# Patient Record
Sex: FEMALE | Race: WHITE | URBAN - METROPOLITAN AREA
[De-identification: names, ages, dates, MRNs, and addresses within clinical notes are randomized per-mention and may not be internally consistent; named-entity substitution may affect disease eponyms.]

---

## 2017-05-01 ENCOUNTER — HOSPITAL ENCOUNTER (EMERGENCY)
Facility: CLINIC | Age: 27
Discharge: HOME | End: 2017-05-01
Attending: PHYSICIAN ASSISTANT | Admitting: PHYSICIAN ASSISTANT
Payer: COMMERCIAL

## 2017-05-01 VITALS
DIASTOLIC BLOOD PRESSURE: 62 MMHG | OXYGEN SATURATION: 96 % | HEART RATE: 84 BPM | SYSTOLIC BLOOD PRESSURE: 112 MMHG | RESPIRATION RATE: 16 BRPM | HEIGHT: 69 IN | BODY MASS INDEX: 18.51 KG/M2 | WEIGHT: 125 LBS | TEMPERATURE: 97.4 F

## 2017-05-01 DIAGNOSIS — N30.00 ACUTE CYSTITIS WITHOUT HEMATURIA: ICD-10-CM

## 2017-05-01 LAB
ALBUMIN UR-MCNC: NEGATIVE MG/DL
APPEARANCE UR: CLEAR
BACTERIA #/AREA URNS HPF: ABNORMAL /HPF
BILIRUB UR QL STRIP: NEGATIVE
COLOR UR AUTO: YELLOW
GLUCOSE UR STRIP-MCNC: NEGATIVE MG/DL
HCG UR QL: NEGATIVE
HGB UR QL STRIP: NEGATIVE
KETONES UR STRIP-MCNC: NEGATIVE MG/DL
LEUKOCYTE ESTERASE UR QL STRIP: ABNORMAL
NITRATE UR QL: NEGATIVE
PH UR STRIP: 5 PH (ref 5–7)
RBC #/AREA URNS AUTO: <1 /HPF (ref 0–2)
SP GR UR STRIP: 1 (ref 1–1.03)
URN SPEC COLLECT METH UR: ABNORMAL
UROBILINOGEN UR STRIP-MCNC: NORMAL MG/DL (ref 0–2)
WBC #/AREA URNS AUTO: 21 /HPF (ref 0–2)

## 2017-05-01 PROCEDURE — 99283 EMERGENCY DEPT VISIT LOW MDM: CPT

## 2017-05-01 PROCEDURE — 81001 URINALYSIS AUTO W/SCOPE: CPT | Performed by: PHYSICIAN ASSISTANT

## 2017-05-01 RX ORDER — CEPHALEXIN 500 MG/1
500 CAPSULE ORAL 4 TIMES DAILY
Qty: 28 CAPSULE | Refills: 0 | Status: SHIPPED | OUTPATIENT
Start: 2017-05-01 | End: 2017-05-08

## 2017-05-01 RX ORDER — DESOGESTREL AND ETHINYL ESTRADIOL 0.15-0.03
1 KIT ORAL DAILY
COMMUNITY

## 2017-05-01 RX ORDER — CEPHALEXIN 500 MG/1
500 CAPSULE ORAL 4 TIMES DAILY
Qty: 28 CAPSULE | Refills: 0 | Status: SHIPPED | OUTPATIENT
Start: 2017-05-01 | End: 2017-05-01

## 2017-05-01 ASSESSMENT — ENCOUNTER SYMPTOMS
VOMITING: 0
DYSURIA: 1
BACK PAIN: 0
HEMATURIA: 0
NAUSEA: 1

## 2017-05-01 NOTE — DISCHARGE INSTRUCTIONS
Discharge Instructions  Urinary Tract Infection  You have urinary tract infection, or UTI. The urinary tract includes the kidneys (which make urine), ureters (the tubes that carry urine from the kidneys to the bladder), the bladder (which stores urine), and urethra (the tube that carries urine out of the bladder).  Urinary tract infections occur when bacteria travel up the urethra into the bladder. We suspect a UTI based on chemical and microscopic findings in your urine, but if there is a question about your findings, we will do a culture to see if bacteria grow. A urine culture takes several days. You should always follow-up with your primary physician to find out about results of your culture if one was done.   Return to the Emergency Department if:    You have severe back pain.    You are vomiting so that you can t take your medicine, or have signs of dehydration (such as urinating less than 3 times per day).    You have fever over 101.5 degrees F.    You have significant confusion or are very weak, or feel very ill.    Your child seems much more ill, won t wake up, won t respond right, or is crying for a long time and won t calm down.    Your child is showing signs of dehydration, Signs of dehydration can be:  o Your infant has had no wet diapers in 4-5 hours.  o Your older child has not passed urine in 6-8 hours.  o Your infant or child starts to have dry mouth and lips, or no saliva or tears.    Follow-up with your doctor:     Children under 24 months need to be seen by their regular doctor within one week after a diagnosis of a UTI. It may be necessary to do some imaging tests to look at the child s kidney or bladder.    You should begin to feel better within 24 - 48 hours of starting your antibiotic.  If you do not, you need to be seen again.      Treatment:     You will be treated with an antibiotic to kill the bacteria. We have to make an educated guess as to which antibiotic will work for your infection.  "In most healthy people, we can guess right almost all of the time. Sometimes a culture is done to show which antibiotics will work. This usually takes 2-3 days. When the culture is done, we may have to contact you to put you on a different antibiotic.    Take a pain medication such as Tylenol  (acetaminophen), Advil  (ibuprofen), Nuprin  (ibuprofen), or Aleve  (naproxen). If you have been given a narcotic such as Vicodin  (hydrocodone with acetaminophen), Percocet  (oxycodone with acetaminophen), or codeine, do not drive for four hours after you have taken it. If the narcotic contains Tylenol  (acetaminophen), do not take Tylenol  with it. All narcotics will cause constipation, so eat a high fiber diet.      Pyridium  (phenazopyridine) or Uristat  (phenazopyridine) is a prescription medication that numbs the bladder to reduce the burning pain of some UTIs.  The same medication is available in a non-prescription version called Azo-Standard  (phenazopyridine), Urodol  (phenazopyridine), or other brand names. This medication will change the color of the urine and tears (usually blue or orange). If you wear contacts, do not wear them while taking this medication as they may be stained by the medication.    Antibiotic Warning:     If you have been placed on antibiotics - watch for signs of allergic reaction.  These include rash, lip swelling, difficulty breathing, wheezing, and dizziness.  If you develop any of these symptoms, stop the antibiotic immediately and go to an emergency room or urgent care for evaluation.    Probiotics: If you have been given an antibiotic, you may want to also take a probiotic pill or eat yogurt with live cultures. Probiotics have \"good bacteria\" to help your intestines stay healthy. Studies have shown that probiotics help prevent diarrhea and other intestine problems (including C. diff infection) when you take antibiotics. You can buy these without a prescription in the pharmacy section of " the store.   If you were given a prescription for medicine here today, be sure to read all of the information (including the package insert) that comes with your prescription.  This will include important information about the medicine, its side effects, and any warnings that you need to know about.  The pharmacist who fills the prescription can provide more information and answer questions you may have about the medicine.  If you have questions or concerns that the pharmacist cannot address, please call or return to the Emergency Department.     Remember that you can always come back to the Emergency Department if you are not able to see your regular doctor in the amount of time listed above, if you get any new symptoms, or if there is anything that worries you.

## 2017-05-01 NOTE — ED AVS SNAPSHOT
Emergency Department    [address] [facility] [address]    Phone:  [phone]    Fax:  [phone]                                       Katya Rosenthal   MRN: [id number]    Department:   Emergency Department   Date of Visit:  5/1/2017           Patient Information     Date Of Birth          1990        Your diagnoses for this visit were:     Acute cystitis without hematuria        You were seen by Eunice Santacruz PA-C.      Follow-up Information     Follow up with  Emergency Department.    Specialty:  EMERGENCY MEDICINE    Why:  If symptoms worsen    Contact information:    [address] [facility] [id number] [phone]        Discharge Instructions       Discharge Instructions  Urinary Tract Infection  You have urinary tract infection, or UTI. The urinary tract includes the kidneys (which make urine), ureters (the tubes that carry urine from the kidneys to the bladder), the bladder (which stores urine), and urethra (the tube that carries urine out of the bladder).  Urinary tract infections occur when bacteria travel up the urethra into the bladder. We suspect a UTI based on chemical and microscopic findings in your urine, but if there is a question about your findings, we will do a culture to see if bacteria grow. A urine culture takes several days. You should always follow-up with your primary physician to find out about results of your culture if one was done.   Return to the Emergency Department if:    You have severe back pain.    You are vomiting so that you can t take your medicine, or have signs of dehydration (such as urinating less than 3 times per day).    You have fever over 101.5 degrees F.    You have significant confusion or are very weak, or feel very ill.    Your child seems much more ill, won t wake up, won t respond right, or is crying for a long time and won t calm down.    Your child is showing signs of dehydration, Signs of dehydration can  be:  o Your infant has had no wet diapers in 4-5 hours.  o Your older child has not passed urine in 6-8 hours.  o Your infant or child starts to have dry mouth and lips, or no saliva or tears.    Follow-up with your doctor:     Children under 24 months need to be seen by their regular doctor within one week after a diagnosis of a UTI. It may be necessary to do some imaging tests to look at the child s kidney or bladder.    You should begin to feel better within 24 - 48 hours of starting your antibiotic.  If you do not, you need to be seen again.      Treatment:     You will be treated with an antibiotic to kill the bacteria. We have to make an educated guess as to which antibiotic will work for your infection. In most healthy people, we can guess right almost all of the time. Sometimes a culture is done to show which antibiotics will work. This usually takes 2-3 days. When the culture is done, we may have to contact you to put you on a different antibiotic.    Take a pain medication such as Tylenol  (acetaminophen), Advil  (ibuprofen), Nuprin  (ibuprofen), or Aleve  (naproxen). If you have been given a narcotic such as Vicodin  (hydrocodone with acetaminophen), Percocet  (oxycodone with acetaminophen), or codeine, do not drive for four hours after you have taken it. If the narcotic contains Tylenol  (acetaminophen), do not take Tylenol  with it. All narcotics will cause constipation, so eat a high fiber diet.      Pyridium  (phenazopyridine) or Uristat  (phenazopyridine) is a prescription medication that numbs the bladder to reduce the burning pain of some UTIs.  The same medication is available in a non-prescription version called Azo-Standard  (phenazopyridine), Urodol  (phenazopyridine), or other brand names. This medication will change the color of the urine and tears (usually blue or orange). If you wear contacts, do not wear them while taking this medication as they may be stained by the  "medication.    Antibiotic Warning:     If you have been placed on antibiotics - watch for signs of allergic reaction.  These include rash, lip swelling, difficulty breathing, wheezing, and dizziness.  If you develop any of these symptoms, stop the antibiotic immediately and go to an emergency room or urgent care for evaluation.    Probiotics: If you have been given an antibiotic, you may want to also take a probiotic pill or eat yogurt with live cultures. Probiotics have \"good bacteria\" to help your intestines stay healthy. Studies have shown that probiotics help prevent diarrhea and other intestine problems (including C. diff infection) when you take antibiotics. You can buy these without a prescription in the pharmacy section of the store.   If you were given a prescription for medicine here today, be sure to read all of the information (including the package insert) that comes with your prescription.  This will include important information about the medicine, its side effects, and any warnings that you need to know about.  The pharmacist who fills the prescription can provide more information and answer questions you may have about the medicine.  If you have questions or concerns that the pharmacist cannot address, please call or return to the Emergency Department.     Remember that you can always come back to the Emergency Department if you are not able to see your regular doctor in the amount of time listed above, if you get any new symptoms, or if there is anything that worries you.        24 Hour Appointment Hotline       To make an appointment at any [facility], call [phone] ([phone]). If you don't have a family doctor or clinic, we will help you find one. [facility] are conveniently located to serve the needs of you and your family.             Review of your medicines      START taking        Dose / Directions Last dose taken    cephALEXin 500 MG capsule   Commonly known as:  " KEFLEX   Dose:  500 mg   Quantity:  28 capsule        Take 1 capsule (500 mg) by mouth 4 times daily for 7 days   Refills:  0          Our records show that you are taking the medicines listed below. If these are incorrect, please call your family doctor or clinic.        Dose / Directions Last dose taken    AZO TABS PO        Refills:  0        desogestrel-ethinyl estradiol 0.15-30 MG-MCG per tablet   Commonly known as:  APRI   Dose:  1 tablet        Take 1 tablet by mouth daily   Refills:  0                Prescriptions were sent or printed at these locations (1 Prescription)                   [facility] [place]  [place], [place] - [address]   [address], [facility] [address], [place] [address]    Telephone:  [phone]   Fax:  [phone]   Hours:                  E-Prescribed (1 of 1)         cephALEXin (KEFLEX) 500 MG capsule                Procedures and tests performed during your visit     HCG qualitative urine    UA with Microscopic      Orders Needing Specimen Collection     None      Pending Results     No orders found from 4/29/2017 to 5/2/2017.            Pending Culture Results     No orders found from 4/29/2017 to 5/2/2017.            Test Results From Your Hospital Stay        5/1/2017  1:43 PM      Component Results     Component Value Ref Range & Units Status    HCG Qual Urine Negative NEG Final         5/1/2017  2:19 PM      Component Results     Component Value Ref Range & Units Status    Color Urine Yellow  Final    Appearance Urine Clear  Final    Glucose Urine Negative NEG mg/dL Final    Bilirubin Urine Negative NEG Final    Ketones Urine Negative NEG mg/dL Final    Specific Gravity Urine 1.003 1.003 - 1.035 Final    Blood Urine Negative NEG Final    pH Urine 5.0 5.0 - 7.0 pH Final    Protein Albumin Urine Negative NEG mg/dL Final    Urobilinogen mg/dL Normal 0.0 - 2.0 mg/dL Final    Nitrite Urine Negative NEG Final    Leukocyte Esterase Urine Moderate (A) NEG Final    Source Midstream  Urine  Final    WBC Urine 21 (H) 0 - 2 /HPF Final    RBC Urine <1 0 - 2 /HPF Final    Bacteria Urine Few (A) NEG /HPF Final                Clinical Quality Measure: Blood Pressure Screening     Your blood pressure was checked while you were in the emergency department today. The last reading we obtained was  BP: 112/62 . Please read the guidelines below about what these numbers mean and what you should do about them.  If your systolic blood pressure (the top number) is less than 120 and your diastolic blood pressure (the bottom number) is less than 80, then your blood pressure is normal. There is nothing more that you need to do about it.  If your systolic blood pressure (the top number) is 120-139 or your diastolic blood pressure (the bottom number) is 80-89, your blood pressure may be higher than it should be. You should have your blood pressure rechecked within a year by a primary care provider.  If your systolic blood pressure (the top number) is 140 or greater or your diastolic blood pressure (the bottom number) is 90 or greater, you may have high blood pressure. High blood pressure is treatable, but if left untreated over time it can put you at risk for heart attack, stroke, or kidney failure. You should have your blood pressure rechecked by a primary care provider within the next 4 weeks.  If your provider in the emergency department today gave you specific instructions to follow-up with your doctor or provider even sooner than that, you should follow that instruction and not wait for up to 4 weeks for your follow-up visit.        Thank you for choosing [facility]       Thank you for choosing [facility] for your care. Our goal is always to provide you with excellent care. Hearing back from our patients is one way we can continue to improve our services. Please take a few minutes to complete the written survey that you may receive in the mail after you visit with us. Thank you!        MyChart Information      "[employer] lets you send messages to your doctor, view your test results, renew your prescriptions, schedule appointments and more. To sign up, go to [email or web address] . Click on \"Log in\" on the left side of the screen, which will take you to the Welcome page. Then click on \"Sign up Now\" on the right side of the page.     You will be asked to enter the access code listed below, as well as some personal information. Please follow the directions to create your username and password.     Your access code is: [email or web address]  Expires: 2017  2:35 PM     Your access code will  in 90 days. If you need help or a new code, please call your [facility] clinic or [phone].        Care EveryWhere ID     This is your Care EveryWhere ID. This could be used by other organizations to access your [facility] medical records  [id number]        After Visit Summary       This is your record. Keep this with you and show to your community pharmacist(s) and doctor(s) at your next visit.                  "

## 2017-05-01 NOTE — ED AVS SNAPSHOT
Emergency Department    [address] [place] [address]    Phone:  [phone]    Fax:  [phone]                                       Katya Rosenthal   MRN: [id number]    Department:   Emergency Department   Date of Visit:  5/1/2017           After Visit Summary Signature Page     I have received my discharge instructions, and my questions have been answered. I have discussed any challenges I see with this plan with the nurse or doctor.    ..........................................................................................................................................  Patient/Patient Representative Signature      ..........................................................................................................................................  Patient Representative Print Name and Relationship to Patient    ..................................................               ................................................  Date                                            Time    ..........................................................................................................................................  Reviewed by Signature/Title    ...................................................              ..............................................  Date                                                            Time

## 2017-05-01 NOTE — ED PROVIDER NOTES
"  History     Chief Complaint:  Urinary frequency and vaginal pain    HPI   Katya Rosenthal is a 27 year old female with a history of UTIs who presents to the emergency department today for evaluation of urinary frequency. The patient reports urinary urgency and dysuria since Saturday morning (~48 hours prior) and has been taking Azo, so the pain has since been bearable. She has frequent UTIs, last about 1.5 years ago. This is the same as her previous UTIs. She tried to self treat with pushing fluids and Azo, however she continues to have symptoms so presents to the ED today. She reports nausea but believes that it's due to taking the medicine without eating, and denies any fevers, hematuria, abdominal pain, vaginal symptoms,  flank or back pain. She voices no further concerns at this time.     Allergies:  No Known Drug Allergies    Medications:    Estradiol (APRI)  Phenazopyridine (Azo)    Past Medical History:    History reviewed. No pertinent past medical history.    Past Surgical History:    Breast Surgery    Family History:    History reviewed. No pertinent family history.     Social History:  Smoking Status: Never smoker  Alcohol Use: Socially  Marital Status:      Review of Systems   Constitutional: Negative for fever.   Gastrointestinal: Positive for nausea. Negative for abdominal pain and vomiting.   Genitourinary: Positive for dysuria and urgency. Negative for difficulty urinating, flank pain, hematuria, pelvic pain, vaginal bleeding, vaginal discharge and vaginal pain.   Musculoskeletal: Negative for back pain.   All other systems reviewed and are negative.      Physical Exam   Vitals:  Patient Vitals for the past 24 hrs:   BP Temp Pulse Resp SpO2 Height Weight   05/01/17 1323 112/62 97.4  F (36.3  C) 84 16 96 % 1.753 m (5' 9\") 56.7 kg (125 lb)       Physical Exam  General: Resting comfortably on the gurney.    Head:  The scalp, head and face appear normal.    ENT:  Pupils are equal, round and reactive to " light.     Oropharynx is moist.  No uvular deviation.   Resp:  Non-labored breathing. No tachypnea.     Lung fields clear to auscultation without wheezes or rales.   CV:  Regular rate and rhythm. Normal S1 and S2, no S3 or S4.     No pathological murmur detected.    GI:  Abdomen is soft and non-distended.     Non-tender to palpation in all four quadrants.      No CVA tenderness bilaterally.   MS:  Normal muscular tone.   Neuro: Awake and alert. Speech is clear.   Skin:  No rash or pallor.  Psych:  Normal affect. Appropriate interactions.     Emergency Department Course     Laboratory:  Laboratory findings were communicated with the patient who voiced understanding of the findings.    UA: LE: Moderate, WBC/HPF: 21 (H), Bacteria: Few (A)    HCG Qualitative Urine: Negative    Emergency Department Course:  Nursing notes and vitals reviewed.  I performed an exam of the patient as documented above.   The patient provided a urine sample here in the emergency department. This was sent for laboratory testing, findings above.  At 1425 the patient was rechecked and was updated on the results of her laboratory studies.   I discussed the treatment plan with the patient. She expressed understanding of this plan and consented to discharge. She will be discharged home with instructions for care and follow up. In addition, the patient will return to the emergency department if their symptoms persist, worsen, if new symptoms arise or if there is any concern.  All questions were answered.  I personally reviewed the laboratory results with the patient and answered all related questions prior to discharge.    Impression & Plan      Medical Decision Making:  Katya Rosenthal is a 27 year old female who presents for evaluation of dysuria.  This clinically is consistent with a urinary tract infection.  Urinalysis confirms the infection.  There has been no fever, back/flank pain or significant abdominal pain.  There is no clinical evidence  of pyelonephritis, appendicitis, colitis, diverticulitis or any intraabdominal catastrophe. The patient will be started on Keflex qid for 7 days for the infection. Return if increasing pain, vomiting, fever, or inability to tolerate the oral antibiotic.  Follow up with primary physician is indicated if not improving in 2-3 days. I discussed the results, plan and any additional questions with the patient. She verbalized understanding and agreement with the plan.      Diagnosis:    ICD-10-CM    1. Acute cystitis without hematuria N30.00        Discharge Medications:  New Prescriptions    CEPHALEXIN (KEFLEX) 500 MG CAPSULE    Take 1 capsule (500 mg) by mouth 4 times daily for 7 days       Scribe Disclosure:  I, Valentino Stringer, am serving as a scribe at 1:42 PM on 5/1/2017 to document services personally performed by Eunice Santacruz PA-C, based on my observations and the provider's statements to me.    5/1/2017    EMERGENCY DEPARTMENT       Eunice Santacruz PA-C  05/02/17 3308

## 2017-05-02 ASSESSMENT — ENCOUNTER SYMPTOMS
FEVER: 0
DIFFICULTY URINATING: 0
ABDOMINAL PAIN: 0
FLANK PAIN: 0